# Patient Record
Sex: FEMALE | Race: WHITE
[De-identification: names, ages, dates, MRNs, and addresses within clinical notes are randomized per-mention and may not be internally consistent; named-entity substitution may affect disease eponyms.]

---

## 2017-04-17 ENCOUNTER — HOSPITAL ENCOUNTER (EMERGENCY)
Dept: HOSPITAL 72 - EMR | Age: 27
Discharge: HOME | End: 2017-04-17
Payer: SELF-PAY

## 2017-04-17 VITALS — BODY MASS INDEX: 26.68 KG/M2 | HEIGHT: 67 IN | WEIGHT: 170 LBS

## 2017-04-17 VITALS — DIASTOLIC BLOOD PRESSURE: 60 MMHG | SYSTOLIC BLOOD PRESSURE: 111 MMHG

## 2017-04-17 VITALS — SYSTOLIC BLOOD PRESSURE: 111 MMHG | DIASTOLIC BLOOD PRESSURE: 60 MMHG

## 2017-04-17 DIAGNOSIS — J02.9: ICD-10-CM

## 2017-04-17 DIAGNOSIS — J45.909: ICD-10-CM

## 2017-04-17 DIAGNOSIS — L25.9: Primary | ICD-10-CM

## 2017-04-17 PROCEDURE — 99284 EMERGENCY DEPT VISIT MOD MDM: CPT

## 2017-04-20 NOTE — EMERGENCY ROOM REPORT
History of Present Illness


General


Chief Complaint:  Skin Rash/Abscess


Source:  Patient, Medical Record





Present Illness


HPI


The patient is a 26 year old female presenting with a total body rash which 

began 3 days prior and also sore throat with ear pain. The pt states she 

recently returned from a tour across [place]. She denies any sick contacts. She 

does admit to using a new detergent the day before the rash began. It is 

described as itchy and denies pain. She denies any known allergies. Sore throat 

is described as a 6/10 dull ache and does not radiate. Pain worse with 

swallowing. She denies other symptoms such as N, V, F, chills, neck pain/

stiffness, blurred vision, dizziness, fatigue, abd pain, CP, SOB


Allergies:  


Coded Allergies:  


     No Known Allergies (Unverified , 7/13/13)





Patient History


Past Medical History:  see triage record


Pertinent Family History:  none


Last Menstrual Period:  3/31/17


Pregnant Now:  No


Reviewed Nursing Documentation:  PMH: Agreed, PSxH: Agreed





Nursing Documentation-PMH


Hx Cardiac Problems:  No


Hx Asthma:  Yes


Hx Cancer:  No


Hx Gastrointestinal Problems:  Yes - Cyclic vomiting syndrome


Hx Dizziness:  Yes


Hx Weakness:  Yes





Review of Systems


All Other Systems:  negative except mentioned in HPI





Physical Exam





Vital Signs








  Date Time  Temp Pulse Resp B/P Pulse Ox O2 Delivery O2 Flow Rate FiO2


 


4/17/17 19:37 97.5 94 14 111/60 98 Room Air  








Sp02 EP Interpretation:  reviewed, normal


General Appearance:  no apparent distress, alert, GCS 15, non-toxic


Head:  normocephalic, atraumatic


Eyes:  bilateral eye PERRL, bilateral eye normal inspection


ENT:  hearing grossly normal, no angioedema, normal voice, uvula midline, 

tonsillar swelling, pharyngeal erythema


Neck:  full range of motion, supple/symm/no masses


Respiratory:  chest non-tender, lungs clear, normal breath sounds, no wheezing, 

speaking full sentences


Cardiovascular #1:  regular rate, rhythm, no edema


Musculoskeletal:  back normal, gait/station normal, normal range of motion, non-

tender


Neurologic:  alert, oriented x3, responsive, motor strength/tone normal, 

sensory intact, speech normal


Psychiatric:  judgement/insight normal, memory normal, mood/affect normal, no 

suicidal/homicidal ideation


Skin:  palpation normal, normal turgor, rash, other - erythema to the anterior 

arms, abdomen, and legs


Lymphatic:  adenopathy - cervical





Medical Decision Making


PA Attestation


Dr. dalton is my supervising physician. Patient management was discussed 

with my supervising physician


Diagnostic Impression:  


 Primary Impression:  


 Contact dermatitis


 Qualified Codes:  L24.0 - Irritant contact dermatitis due to detergents


 Additional Impression:  


 Pharyngitis, acute


 Qualified Codes:  J02.9 - Acute pharyngitis, unspecified


ER Course


The patient is a 26 year old female presenting with a total body rash





Differential diagnosis include but not limited to pharyngitis, sinusitis, AOM, 

bronchitis, PNA, meningitis


Ddx considered include but not limited to insect bite, contact dermatitis, 

eczema, cellulitis





Physical exam: Vitals within normal limits.  Afebrile.  No apparent distress


HEENT exam: There is bilateral tonsillar edema, erythema,  Uvula midline.  

Moist mucous membranes.


There is bilateral cervical lymphadenopathy.


Lungs are clear to auscultation bilaterally


Skin is warm and dry.  There is erythema to the anterior arms, abdomen, and legs





The patient will be discharged home with a prescription for amoxicillin, 

benadryl, hydrocortisone, and  and is given ER precautions.  Patient will 

followup with primary care.


Pt will stop using the new detergent.





Last Vital Signs








  Date Time  Temp Pulse Resp B/P Pulse Ox O2 Delivery O2 Flow Rate FiO2


 


4/17/17 20:13 97.5  14 111/60 98 Room Air  


 


4/17/17 20:12  88      








Status:  improved


Disposition:  HOME, SELF-CARE


Condition:  Stable


Scripts


Amoxicillin* (AMOXIL*) 500 Mg Capsule


500 MG ORAL Q12HR, #20 CAP


   Prov: TERZIAN,BALA P.A.         4/17/17 


Diphenhydramine Hcl* (BENADRYL*) 25 Mg Capsule


25 MG ORAL Q6H Y for Itching, #20 CAP


   Prov: TERZIAN,BALA P.A.         4/17/17 


Hydrocortisone 1% cream (Hydrocortisone 1% cream) Y Cr


28.4 GM TP TID, #28 GM


   Prov: TERZIAN,BALA P.A.         4/17/17


Referrals:  


NOT CHOSEN [facility]/MD,REFERRING (PCP)


Patient Instructions:  Rash, Pharyngitis





Additional Instructions:  


I discussed my findings with the patient. All questions and concerns have been 

answered. Treatment and medication compliance have been addressed. I advised 

the patient that they need to follow up with PMD in 3-5 days. Return to ED if 

symptoms worsen, new symptoms arise, or if needed for any reason. Patient 

verbalized understanding of discharge instructions.











BALA HERNANDEZ Apr 20, 2017 18:14

## 2018-08-17 ENCOUNTER — HOSPITAL ENCOUNTER (EMERGENCY)
Dept: HOSPITAL 72 - EMR | Age: 28
LOS: 1 days | Discharge: HOME | End: 2018-08-18
Payer: SELF-PAY

## 2018-08-17 VITALS — HEIGHT: 65 IN | WEIGHT: 180 LBS | BODY MASS INDEX: 29.99 KG/M2

## 2018-08-17 DIAGNOSIS — L73.9: Primary | ICD-10-CM

## 2018-08-17 PROCEDURE — 99283 EMERGENCY DEPT VISIT LOW MDM: CPT

## 2018-08-17 PROCEDURE — 81025 URINE PREGNANCY TEST: CPT

## 2018-08-17 PROCEDURE — 81003 URINALYSIS AUTO W/O SCOPE: CPT

## 2018-08-18 VITALS — DIASTOLIC BLOOD PRESSURE: 80 MMHG | SYSTOLIC BLOOD PRESSURE: 130 MMHG

## 2018-08-18 LAB
APPEARANCE UR: CLEAR
APTT PPP: YELLOW S
GLUCOSE UR STRIP-MCNC: NEGATIVE MG/DL
KETONES UR QL STRIP: (no result)
LEUKOCYTE ESTERASE UR QL STRIP: (no result)
NITRITE UR QL STRIP: NEGATIVE
PH UR STRIP: 5 [PH] (ref 4.5–8)
PROT UR QL STRIP: (no result)
SP GR UR STRIP: 1.02 (ref 1–1.03)
UROBILINOGEN UR-MCNC: 1 MG/DL (ref 0–1)

## 2018-08-18 NOTE — EMERGENCY ROOM REPORT
History of Present Illness


General


Chief Complaint:  Female Urogenital Problems


Source:  Patient





Present Illness


HPI


Is a 28-year-old female with no past medical history.  She presents with chief 

complaint of vaginal irritation.  She is concerned that she may have an STD.  

She only had one sexual partner and that was over a year ago.  She noticed some 

rash and bumps to the vaginal area.  She was concerned that this may be herpes 

or other STD.  No discharge.  No urinary complaint.  Denies any other complaint.


Allergies:  


Coded Allergies:  


     No Known Allergies (Unverified , 7/13/13)





Patient History


Past Medical History:  see triage record, old chart reviewed


Past Surgical History:  none


Pertinent Family History:  none


Social History:  Denies: smoking


Pregnant Now:  No


Immunizations:  other


Reviewed Nursing Documentation:  PMH: Agreed; PSxH: Agreed





Nursing Documentation-PMH


Past Medical History:  No Stated History


Hx Cardiac Problems:  No


Hx Asthma:  Yes


Hx Cancer:  No


Hx Gastrointestinal Problems:  Yes - Cyclic vomiting syndrome


Hx Dizziness:  Yes


Hx Weakness:  Yes





Review of Systems


Eye:  Denies: eye pain, blurred vision


ENT:  Denies: ear pain, nose congestion, throat swelling


Respiratory:  Denies: cough, shortness of breath


Cardiovascular:  Denies: chest pain, palpitations


Gastrointestinal:  Denies: abdominal pain, diarrhea, nausea, vomiting


Musculoskeletal:  Denies: back pain, joint pain


Skin:  Denies: rash


Neurological:  Denies: headache, numbness


Endocrine:  Denies: increased thirst, increased urine


Hematologic/Lymphatic:  Denies: easy bruising


All Other Systems:  negative except mentioned in HPI





Physical Exam





Vital Signs








  Date Time  Temp Pulse Resp B/P (MAP) Pulse Ox O2 Delivery O2 Flow Rate FiO2


 


8/18/18 00:05 98.4 78 16 130/80 98 Room Air  





 98.4       





vitals normal


Sp02 EP Interpretation:  reviewed, normal


General Appearance:  well appearing, no apparent distress, alert


Head:  normocephalic, atraumatic


Eyes:  bilateral eye PERRL, bilateral eye EOMI


ENT:  hearing grossly normal, normal pharynx


Neck:  full range of motion, supple, no meningismus


Respiratory:  chest non-tender, lungs clear, normal breath sounds


Cardiovascular #1:  regular rate, rhythm, no murmur


Gastrointestinal:  normal bowel sounds, non tender, no mass, no organomegaly, 

no bruit, non-distended


Genitourinary:  other - Pelvic exam done with female nurse as chaperone.  

External exam show skin irritation of the hair follicle mostly on the left 

side.  She had previously shaved her pubic hair.  No abscess.  No vesicular 

lesion.


Musculoskeletal:  back normal, gait/station normal, normal range of motion


Neurologic:  alert, oriented x3


Psychiatric:  anxious


Skin:  warm/dry





Medical Decision Making


Diagnostic Impression:  


 Primary Impression:  


 Folliculitis


ER Course


Patient with irritation of the skin from shaving.  She may have an early 

folliculitis.  We'll go ahead and put on antibiotic ointment and antibiotics.  

No evidence of STD.  Explained to patient that she can get outpatient testing 

for this.  This can be done anonymously.





Last Vital Signs








  Date Time  Temp Pulse Resp B/P (MAP) Pulse Ox O2 Delivery O2 Flow Rate FiO2


 


8/18/18 00:05 98.4 78 16 130/80 98 Room Air  





 98.4       








Status:  improved


Disposition:  HOME, SELF-CARE


Condition:  Stable


Scripts


Trimethoprim/Sulfamethoxazole 160/800* (BACTRIM DS TABLET*) 1 Each Tablet


1 TAB ORAL Q12H, #14 TAB 0 Refills


   Prov: RUBY OTERO M.D.         8/18/18 


Mupirocin* (MUPIROCIN*) 22 Gm Oint...g.


1 APPLIC TOPIC THREE TIMES A DAY, #22 GM


   Prov: RUBY OTERO M.D.         8/18/18


Referrals:  


NOT CHOSEN IPA/MD,REFERRING (PCP)





Additional Instructions:  


Clean area with hydrogen peroxide first.  Then apply antibiotic ointment.  

Follow-up with your doctor within a week as needed.  You may get outpatient 

testing for STDs.  This can be done anonymously.  Return if worse.











RUBY OTERO M.D. Aug 18, 2018 00:36

## 2020-09-08 ENCOUNTER — HOSPITAL ENCOUNTER (EMERGENCY)
Dept: HOSPITAL 72 - EMR | Age: 30
Discharge: HOME | End: 2020-09-08
Payer: SELF-PAY

## 2020-09-08 VITALS — SYSTOLIC BLOOD PRESSURE: 120 MMHG | DIASTOLIC BLOOD PRESSURE: 87 MMHG

## 2020-09-08 VITALS — HEIGHT: 67 IN | BODY MASS INDEX: 26.84 KG/M2 | WEIGHT: 171 LBS

## 2020-09-08 VITALS — DIASTOLIC BLOOD PRESSURE: 89 MMHG | SYSTOLIC BLOOD PRESSURE: 122 MMHG

## 2020-09-08 DIAGNOSIS — K52.9: Primary | ICD-10-CM

## 2020-09-08 DIAGNOSIS — F41.9: ICD-10-CM

## 2020-09-08 DIAGNOSIS — R11.15: ICD-10-CM

## 2020-09-08 LAB
ADD MANUAL DIFF: NO
ALBUMIN SERPL-MCNC: 4.5 G/DL (ref 3.4–5)
ALBUMIN/GLOB SERPL: 1.3 {RATIO} (ref 1–2.7)
ALP SERPL-CCNC: 56 U/L (ref 46–116)
ALT SERPL-CCNC: 20 U/L (ref 12–78)
ANION GAP SERPL CALC-SCNC: 10 MMOL/L (ref 5–15)
APPEARANCE UR: (no result)
APTT PPP: YELLOW S
AST SERPL-CCNC: 19 U/L (ref 15–37)
BASOPHILS NFR BLD AUTO: 1.6 % (ref 0–2)
BILIRUB SERPL-MCNC: 0.5 MG/DL (ref 0.2–1)
BUN SERPL-MCNC: 8 MG/DL (ref 7–18)
CALCIUM SERPL-MCNC: 8.7 MG/DL (ref 8.5–10.1)
CHLORIDE SERPL-SCNC: 103 MMOL/L (ref 98–107)
CO2 SERPL-SCNC: 27 MMOL/L (ref 21–32)
CREAT SERPL-MCNC: 0.8 MG/DL (ref 0.55–1.3)
EOSINOPHIL NFR BLD AUTO: 1.3 % (ref 0–3)
ERYTHROCYTE [DISTWIDTH] IN BLOOD BY AUTOMATED COUNT: 13 % (ref 11.6–14.8)
GLOBULIN SER-MCNC: 3.5 G/DL
GLUCOSE UR STRIP-MCNC: NEGATIVE MG/DL
HCT VFR BLD CALC: 42.5 % (ref 37–47)
HGB BLD-MCNC: 13.9 G/DL (ref 12–16)
KETONES UR QL STRIP: (no result)
LEUKOCYTE ESTERASE UR QL STRIP: (no result)
LYMPHOCYTES NFR BLD AUTO: 32.6 % (ref 20–45)
MCV RBC AUTO: 92 FL (ref 80–99)
MONOCYTES NFR BLD AUTO: 8.7 % (ref 1–10)
NEUTROPHILS NFR BLD AUTO: 55.9 % (ref 45–75)
NITRITE UR QL STRIP: NEGATIVE
PH UR STRIP: 7 [PH] (ref 4.5–8)
PLATELET # BLD: 309 K/UL (ref 150–450)
POTASSIUM SERPL-SCNC: 3.7 MMOL/L (ref 3.5–5.1)
PROT UR QL STRIP: (no result)
RBC # BLD AUTO: 4.61 M/UL (ref 4.2–5.4)
SODIUM SERPL-SCNC: 140 MMOL/L (ref 136–145)
SP GR UR STRIP: 1.01 (ref 1–1.03)
UROBILINOGEN UR-MCNC: NORMAL MG/DL (ref 0–1)
WBC # BLD AUTO: 7.7 K/UL (ref 4.8–10.8)

## 2020-09-08 PROCEDURE — 83735 ASSAY OF MAGNESIUM: CPT

## 2020-09-08 PROCEDURE — 96376 TX/PRO/DX INJ SAME DRUG ADON: CPT

## 2020-09-08 PROCEDURE — 99284 EMERGENCY DEPT VISIT MOD MDM: CPT

## 2020-09-08 PROCEDURE — 83690 ASSAY OF LIPASE: CPT

## 2020-09-08 PROCEDURE — 85025 COMPLETE CBC W/AUTO DIFF WBC: CPT

## 2020-09-08 PROCEDURE — 81003 URINALYSIS AUTO W/O SCOPE: CPT

## 2020-09-08 PROCEDURE — 96375 TX/PRO/DX INJ NEW DRUG ADDON: CPT

## 2020-09-08 PROCEDURE — 36415 COLL VENOUS BLD VENIPUNCTURE: CPT

## 2020-09-08 PROCEDURE — 96374 THER/PROPH/DIAG INJ IV PUSH: CPT

## 2020-09-08 PROCEDURE — 81025 URINE PREGNANCY TEST: CPT

## 2020-09-08 PROCEDURE — 80053 COMPREHEN METABOLIC PANEL: CPT

## 2020-09-08 PROCEDURE — 96361 HYDRATE IV INFUSION ADD-ON: CPT

## 2020-09-08 PROCEDURE — 80307 DRUG TEST PRSMV CHEM ANLYZR: CPT

## 2020-09-08 PROCEDURE — 74176 CT ABD & PELVIS W/O CONTRAST: CPT

## 2020-09-08 NOTE — EMERGENCY ROOM REPORT
History of Present Illness


General


Chief Complaint:  Vomiting


Source:  Patient





Present Illness


HPI


Patient is a 30-year-old female past medical history of cyclical vomiting who 

presents to the ER complaining of generalized weakness, nausea and vomiting for 

the past several days.  Patient states that she was seen here 2 days ago and 

had normal lab work and was discharged home.  Patient states that she has a 

history of anxiety and that the nausea and vomiting has exacerbated her 

anxiety.  She denies any fever or chills.  She denies any chest pain or 

shortness of breath.  She denies any dysuria or hematuria.  She denies any 

blood or bilious vomitus.


Allergies:  


Coded Allergies:  


     No Known Allergies (Unverified , 13)





COVID-19 Screening


Contact w/high risk pt:  No


Experienced COVID-19 symptoms?:  No


COVID-19 Testing performed PTA:  No





Patient History


Last Menstrual Period:  20


Pregnant Now:  No


:  0


Para:  0


Reviewed Nursing Documentation:  PMH: Agreed; PSxH: Agreed





Nursing Documentation-PMH


Past Medical History:  No Stated History


Hx Cardiac Problems:  No


Hx Asthma:  Yes


Hx Cancer:  No


Hx Gastrointestinal Problems:  Yes - Cyclic vomiting syndrome


Hx Dizziness:  Yes


Hx Weakness:  Yes





Review of Systems


All Other Systems:  negative except mentioned in HPI





Physical Exam





Vital Signs








  Date Time  Temp Pulse Resp B/P (MAP) Pulse Ox O2 Delivery O2 Flow Rate FiO2


 


20 19:26 98.6 85 16 136/95 (109) 92 Room Air  








Sp02 EP Interpretation:  reviewed, normal


General Appearance:  no apparent distress, alert, GCS 15, non-toxic


Head:  normocephalic, atraumatic


Eyes:  bilateral eye normal inspection, bilateral eye PERRL


ENT:  hearing grossly normal, normal pharynx, no angioedema, normal voice


Neck:  full range of motion, supple/symm/no masses


Respiratory:  chest non-tender, lungs clear, normal breath sounds, speaking 

full sentences


Cardiovascular #1:  regular rate, rhythm, no edema


Gastrointestinal:  other


Rectal:  deferred


Genitourinary:  no CVA tenderness


Musculoskeletal:  normal range of motion


Neurologic:  CNs III-XII nml as tested, oriented x3


Psychiatric:  no suicidal/homicidal ideation


Skin:  no rash


Lymphatic:  no adenopathy





Medical Decision Making


Diagnostic Impression:  


 Primary Impression:  


 Gastroenteritis


 Additional Impression:  


 cyclic vomiting syndrome


ER Course


Patient's labs demonstrate no significant acute abnormalities.  Patient's UA 

does demonstrate RBCs but patient is currently menstruating.  Patient CT 

consistent with gastroenteritis.  Patient given IV fluids, Zofran and Pepcid.  

On reevaluation she feels improved.  I have given her a copy of her CAT scan 

results.  After discussing risks and benefits of further diagnostics, treatment 

plans, as well as indications for and risks of admission, the patient is 

agreeable to being discharged home.  I have explained that their evaluation and 

treatment in the emergency department today is an important step towards them 

achieving better health but that their evaluation today is not intended to 

replace further evaluation and treatment by a physician in their local clinic.  

I have explained that while the current findings suggest no immediate life 

threatening emergency they will require further evaluation and treatment by a 

physician of their choice in their area.  They understand that it will be 

necessary for them to review the final reports of their ED visit with their 

clinic physician.  We have reviewed indications for return to the Emergency 

Department.  I have explained that additional time may need to pass and/or 

additional testing as an outpatient may be necessary before a definitive 

diagnosis can be made.  They tell me they are willing to follow up as 

instructed within the timeframe I recommend.  They appear to understand what we 

discussed.  Additionally they understand that if they are unable to be seen by 

an outpatient physician they are welcome, and in fact should, return to the 

Emergency Department for a repeat evaluation.  The patient is stable at time of 

discharge.





Laboratory Tests








Test


  20


19:44


 


White Blood Count


  7.7 K/UL


(4.8-10.8)


 


Red Blood Count


  4.61 M/UL


(4.20-5.40)


 


Hemoglobin


  13.9 G/DL


(12.0-16.0)


 


Hematocrit


  42.5 %


(37.0-47.0)


 


Mean Corpuscular Volume 92 FL (80-99)  


 


Mean Corpuscular Hemoglobin


  30.2 PG


(27.0-31.0)


 


Mean Corpuscular Hemoglobin


Concent 32.8 G/DL


(32.0-36.0)


 


Red Cell Distribution Width


  13.0 %


(11.6-14.8)


 


Platelet Count


  309 K/UL


(150-450)


 


Mean Platelet Volume


  7.4 FL


(6.5-10.1)


 


Neutrophils (%) (Auto)


  55.9 %


(45.0-75.0)


 


Lymphocytes (%) (Auto)


  32.6 %


(20.0-45.0)


 


Monocytes (%) (Auto)


  8.7 %


(1.0-10.0)


 


Eosinophils (%) (Auto)


  1.3 %


(0.0-3.0)


 


Basophils (%) (Auto)


  1.6 %


(0.0-2.0)


 


Urine Color Yellow  


 


Urine Appearance Cloudy  


 


Urine pH 7 (4.5-8.0)  


 


Urine Specific Gravity


  1.015


(1.005-1.035)


 


Urine Protein


  1+ (NEGATIVE)


H


 


Urine Glucose (UA)


  Negative


(NEGATIVE)


 


Urine Ketones


  4+ (NEGATIVE)


H


 


Urine Blood


  5+ (NEGATIVE)


H


 


Urine Nitrite


  Negative


(NEGATIVE)


 


Urine Bilirubin


  Negative


(NEGATIVE)


 


Urine Urobilinogen


  Normal MG/DL


(0.0-1.0)


 


Urine Leukocyte Esterase


  1+ (NEGATIVE)


H


 


Urine RBC


  Tntc /HPF (0 -


2)  H


 


Urine WBC


  2-4 /HPF (0 -


2)


 


Urine Squamous Epithelial


Cells Few /LPF


(NONE/OCC)


 


Urine Bacteria


  Few /HPF


(NONE)


 


Urine HCG, Qualitative


  Negative


(NEGATIVE)


 


Sodium Level


  140 MMOL/L


(136-145)


 


Potassium Level


  3.7 MMOL/L


(3.5-5.1)


 


Chloride Level


  103 MMOL/L


()


 


Carbon Dioxide Level


  27 MMOL/L


(21-32)


 


Anion Gap


  10 mmol/L


(5-15)


 


Blood Urea Nitrogen


  8 mg/dL (7-18)


 


 


Creatinine


  0.8 MG/DL


(0.55-1.30)


 


Estimated Glomerular


Filtration Rate > 60 mL/min


(>60)


 


Glucose Level


  100 MG/DL


()


 


Calcium Level


  8.7 MG/DL


(8.5-10.1)


 


Magnesium Level


  2.4 MG/DL


(1.8-2.4)


 


Total Bilirubin


  0.5 MG/DL


(0.2-1.0)


 


Aspartate Amino Transferase


(AST) 19 U/L (15-37)


 


 


Alanine Aminotransferase (ALT)


  20 U/L (12-78)


 


 


Alkaline Phosphatase


  56 U/L


()


 


Total Protein


  8.0 G/DL


(6.4-8.2)


 


Albumin


  4.5 G/DL


(3.4-5.0)


 


Globulin 3.5 g/dL  


 


Albumin/Globulin Ratio 1.3 (1.0-2.7)  


 


Lipase


  146 U/L


()


 


Urine Opiates Screen


  Negative


(NEGATIVE)


 


Urine Barbiturates Screen


  Negative


(NEGATIVE)


 


Phencyclidine (PCP) Screen


  Negative


(NEGATIVE)


 


Urine Amphetamines Screen


  Negative


(NEGATIVE)


 


Urine Benzodiazepines Screen


  Negative


(NEGATIVE)


 


Urine Cocaine Screen


  Negative


(NEGATIVE)


 


Urine Marijuana (THC) Screen


  Positive


(NEGATIVE)  H











Last Vital Signs








  Date Time  Temp Pulse Resp B/P (MAP) Pulse Ox O2 Delivery O2 Flow Rate FiO2


 


20 19:50  85 16 136/95 92   


 


20 19:26 98.6     Room Air  








Disposition:  HOME, SELF-CARE


Condition:  Stable


Scripts


Famotidine* (Pepcid 20mg tablet*) 20 Mg Tablet


20 MG ORAL DAILY, #30 TAB 0 Refills


   Prov: Dipika Pandya M.D.         20 


Ondansetron* (ZOFRAN*) 4 Mg Tablet


4 MG ORAL Q6H PRN for Nausea & Vomiting, #20 TAB


   Prov: Dipika Pandya M.D.         20





Additional Instructions:  


The patient was provided with discharge instructions, notified to follow-up 

with a primary care doctor and or specialist in the next 24-48 hours, and to 

return to the ED if they have worsening of their symptoms. 





Please note that this report is being documented using DRAGON technology.


This can lead to erroneous entry secondary to incorrect interpretation by the 

dictating instrument.











Dipika Pandya M.D. Sep 8, 2020 19:57

## 2020-09-08 NOTE — DIAGNOSTIC IMAGING REPORT
EXAM:

  CT Abdomen and Pelvis Without Intravenous Contrast

 

CLINICAL HISTORY:

  PAIN

 

TECHNIQUE:

  Axial computed tomography images of the abdomen and pelvis without 

intravenous contrast.  CTDI is 6.2 mGy and DLP is 337 mGy-cm.  One or 

more of the following dose reduction techniques were used: automated 

exposure control, adjustment of the mA and/or kV according to patient 

size, use of iterative reconstruction technique.

 

COMPARISON:

  No relevant prior studies available.

 

FINDINGS:

  Lung bases:  Unremarkable.  No mass.  No consolidation.

 

 ABDOMEN:

  Liver:  See below.  

  Gallbladder and bile ducts:  See below.  

  Pancreas:  See below.  

  Spleen:  See below.  

  Adrenals:  Unremarkable.  No mass.

  Kidneys and ureters:  See below.  

  Stomach and bowel:  Fluid within nondistended loops of small bowel and 

within stomach without bowel wall thickening or surrounding inflammation 

can be normal or can be seen with gastroenteritis in the right clinical 

setting.

 

 PELVIS:

  Appendix:  No appendicitis, inflammatory changes of bowel or bowel 

obstruction.

  Bladder:  Unremarkable.  No stones.

  Reproductive:  Unremarkable as visualized.

 

 ABDOMEN and PELVIS:

  Intraperitoneal space:  No free fluid. No free air.

  Bones/joints:  No acute fracture.  No dislocation.

  Soft tissues:  Unremarkable.

  Vasculature:  Aorta, liver, spleen, pancreas, gallbladder, and kidneys 

are unremarkable.  No abdominal aortic aneurysm.

  Lymph nodes:  Unremarkable.  No enlarged lymph nodes.

 

IMPRESSION:     

1.  Fluid within nondistended loops of small bowel and within stomach 

without bowel wall thickening or surrounding inflammation can be normal 

or can be seen with gastroenteritis in the right clinical setting.

2.  No other acute disease or bowel obstruction.

## 2020-09-08 NOTE — NUR
ER DISCHARGE NOTE:



Patient is cleared to be discharged per ERMD, pt is aox4, on room air, with 
stable vital signs. pt was given dc and prescription instructions, pt was able 
to verbalize understanding, pt id band and iv site removed intact without 
complications. pt is able to ambulate with steady gait. pt took all belongings. 
pt stable upon discharge.

## 2020-09-08 NOTE — NUR
ED Nurse Note:



Patient walked in from home d/t n/v as well as anxiety, patient reports she was 
seen at [facility] two days ago, labs were normal per pt and was discharged home. 
Patient reports she has not been able to keep anything PO fluids or food down. 
Patient aao x 4 and ambulatory with steady gait. Patient stable during 
assessment.